# Patient Record
(demographics unavailable — no encounter records)

---

## 2024-10-09 NOTE — PAST MEDICAL HISTORY
[At Term] : at term [Normal Vaginal Route] : by normal vaginal route [None] : there were no delivery complications [Age Appropriate] : age appropriate developmental milestones met [FreeTextEntry1] : Normal

## 2024-10-09 NOTE — CONSULT LETTER
[Dear  ___] : Dear  [unfilled], [Consult Letter:] : I had the pleasure of evaluating your patient, [unfilled]. [Please see my note below.] : Please see my note below. [Consult Closing:] : Thank you very much for allowing me to participate in the care of this patient.  If you have any questions, please do not hesitate to contact me. [Sincerely,] : Sincerely, [FreeTextEntry2] : RUBA CHUNG [FreeTextEntry3] : Montana Carlton MD

## 2024-10-09 NOTE — PHYSICAL EXAM
[Healthy Appearing] : healthy appearing [Well Nourished] : well nourished [Interactive] : interactive [Normal Appearance] : normal appearance [Well formed] : well formed [Normally Set] : normally set [Normal S1 and S2] : normal S1 and S2 [Clear to Ausculation Bilaterally] : clear to auscultation bilaterally [Abdomen Soft] : soft [Abdomen Tenderness] : non-tender [] : no hepatosplenomegaly [1] : was Sesar stage 1 [___] : [unfilled] [Normal] : normal  [Murmur] : no murmurs

## 2024-10-09 NOTE — HISTORY OF PRESENT ILLNESS
[Headaches] : headaches [Visual Symptoms] : no ~T visual symptoms [Polyuria] : no polyuria [Polydipsia] : no polydipsia [FreeTextEntry2] : BUTCH presents with his father for evaluation of his growth. Growth chart shows a falling height percentile from the third at 3 years to well below 3td by 11 years.  The BMI percentile has remained stable.  Bone age done at [facility] on 10/12/2023 revealed a bone age of 10 years at chronological age 11 years. He has occasional mild headaches.  He is otherwise in good health. There is an element of genetic short stature since his father stands 5 feet 4 inches tall.  His father also had delayed puberty. 7th grade